# Patient Record
Sex: MALE | Race: WHITE
[De-identification: names, ages, dates, MRNs, and addresses within clinical notes are randomized per-mention and may not be internally consistent; named-entity substitution may affect disease eponyms.]

---

## 2021-07-22 NOTE — CT
EXAMINATION TYPE: CT abdomen pelvis w con

 

DATE OF EXAM: 7/22/2021

 

COMPARISON: None

 

HISTORY: Cancer Suspected

 

CT DLP: 1730 mGycm

 

CONTRAST: 

CT scan of the abdomen and pelvis is performed with Oral Contrast and with IV Contrast, patient injec
ayla with 100 mL of Isovue 300.

 

FINDINGS: 

LUNG BASES-: No visible nodule.  No infiltrate. 

 

LIVER/GB:   No calcified gallstones.  There is mild hepatic steatosis. No space occupying hepatic les
ion. Biliary tree is of normal caliber. 

 

PANCREAS:  No inflammation.  No distinct mass. 

 

SPLEEN:  No splenic enlargement.  No lesion seen. 

 

ADRENALS:  No nodule.  No thickening. 

 

KIDNEYS/BLADDER:  No hydronephrosis.  No nephrolithiasis.  No distinct renal mass.  Urinary bladder g
rossly unremarkable. 

 

BOWEL: Normal appendix.  Normal bowel caliber.  No inflammation.

 

GENITAL ORGANS:  No gross abnormality. 

 

LYMPH NODES:  No greater than 1cm abdominal or pelvic lymph nodes are appreciated.

 

AORTA: No significant abnormality. 

 

OSSEOUS STRUCTURES:  No significant abnormality is seen. 

 

OTHER:  No significant additional abnormality is seen. 

 

IMPRESSION: 

1. Hepatic steatosis.

2. No osseous lesions identified. Correlate with MRI and bone scan.

## 2021-08-05 ENCOUNTER — HOSPITAL ENCOUNTER (OUTPATIENT)
Dept: HOSPITAL 47 - RADNMMAIN | Age: 46
Discharge: HOME | End: 2021-08-05
Attending: INTERNAL MEDICINE
Payer: COMMERCIAL

## 2021-08-05 DIAGNOSIS — M25.551: Primary | ICD-10-CM

## 2021-08-05 PROCEDURE — 78306 BONE IMAGING WHOLE BODY: CPT

## 2021-08-05 NOTE — NM
EXAMINATION TYPE: NM bone scan whole body

 

DATE OF EXAM: 8/5/2021

 

COMPARISON: NONE

 

HISTORY: A right hip pain

 

Delayed whole-body scanning was performed following the injection of 23.5 mCi Tc 99m MDP.  Images acq
uired 3 hours post injection.

 

FINDINGS: Abnormal uptake involving the sternoclavicular joints and shoulders likely post arthritic. 
Abnormal uptake involving the right knee likely post arthritic. Abnormal uptake involving the ankles 
and first digit bilaterally the feet likely post arthritic.

 

No suspicious increased or reduced uptake involving the proximal right femur. Abnormal uptake involvi
ng the maxilla likely related to periodontal disease. Faint uptake at L5-S1. Faint uptake involving t
he right inferior sacrum near the SI joint.

 

IMPRESSION:

1. Faint uptake involving L5-S1 is nonspecific but likely degenerative.

2. Faint uptake involving the right hemipelvis possibly related to the right SI joint demonstrates hy
pertrophic spurring on recent CT scan likely accounting for the finding.